# Patient Record
Sex: FEMALE | Race: OTHER | HISPANIC OR LATINO
[De-identification: names, ages, dates, MRNs, and addresses within clinical notes are randomized per-mention and may not be internally consistent; named-entity substitution may affect disease eponyms.]

---

## 2024-07-31 DIAGNOSIS — Z00.00 ENCOUNTER FOR GENERAL ADULT MEDICAL EXAMINATION W/OUT ABNORMAL FINDINGS: ICD-10-CM

## 2024-08-12 ENCOUNTER — RESULT REVIEW (OUTPATIENT)
Age: 29
End: 2024-08-12

## 2024-08-12 ENCOUNTER — APPOINTMENT (OUTPATIENT)
Dept: HEMATOLOGY ONCOLOGY | Facility: CLINIC | Age: 29
End: 2024-08-12
Payer: COMMERCIAL

## 2024-08-12 VITALS
RESPIRATION RATE: 16 BRPM | OXYGEN SATURATION: 99 % | HEIGHT: 55 IN | DIASTOLIC BLOOD PRESSURE: 55 MMHG | TEMPERATURE: 96.7 F | HEART RATE: 71 BPM | SYSTOLIC BLOOD PRESSURE: 97 MMHG | BODY MASS INDEX: 36.4 KG/M2 | WEIGHT: 157.31 LBS

## 2024-08-12 DIAGNOSIS — D50.9 ANEMIA COMPLICATING PREGNANCY, UNSPECIFIED TRIMESTER: ICD-10-CM

## 2024-08-12 DIAGNOSIS — Z78.9 OTHER SPECIFIED HEALTH STATUS: ICD-10-CM

## 2024-08-12 DIAGNOSIS — Z83.3 FAMILY HISTORY OF DIABETES MELLITUS: ICD-10-CM

## 2024-08-12 DIAGNOSIS — O99.019 ANEMIA COMPLICATING PREGNANCY, UNSPECIFIED TRIMESTER: ICD-10-CM

## 2024-08-12 DIAGNOSIS — Z86.2 PERSONAL HISTORY OF DISEASES OF THE BLOOD AND BLOOD-FORMING ORGANS AND CERTAIN DISORDERS INVOLVING THE IMMUNE MECHANISM: ICD-10-CM

## 2024-08-12 PROCEDURE — 99205 OFFICE O/P NEW HI 60 MIN: CPT

## 2024-08-12 RX ORDER — PRENATAL VIT 116/IRON/FA/DHA 28-800-200
CAPSULE ORAL
Refills: 0 | Status: ACTIVE | COMMUNITY

## 2024-08-12 RX ORDER — IRON/IRON ASP GLY/FA/MV-MIN 38 125-25-1MG
TABLET ORAL
Refills: 0 | Status: ACTIVE | COMMUNITY

## 2024-08-12 RX ORDER — ASCORBIC ACID 500 MG
TABLET ORAL
Refills: 0 | Status: ACTIVE | COMMUNITY

## 2024-08-12 NOTE — HISTORY OF PRESENT ILLNESS
[de-identified] : 28 year F  here with anemia.   Denies anemia prior to pregnancy.   Told she was anemic 3 months ago.   Has been compliant with oral iron supplements.   Denies history of thyroid or celiac disease. Denies heavy menses Denies EGD or colonoscopy   Denies fatigue.   Patient has/hasn't taken iron supplemenmts or IV iron in the past.   patient here for consultation for anemia. Referred by [facility].  Nan#[id number]. Due date September 1. 2nd pregnancy. no prior anemia in 1st pregnancy. no prior anemia before pregnancy. no recent colonoscopy or endoscopy. no bleeding.

## 2024-08-12 NOTE — ASSESSMENT
[FreeTextEntry1] :  #Iron deficiency anemia of pregnancy Discussed physiology of iron and erythropoiesis. Discussed iron tablet vs liquid vs intravenous 38 weeks pregnant Patient started oral iron compliant If Ferritin <30 Will do IV iron with venofer x 5 doses and follow up Oct/Nov Follow up 2 weeks after last IV iron appointment   Labs next appointment: CBC, iron, B12, ferritin, retic

## 2024-08-12 NOTE — HISTORY OF PRESENT ILLNESS
[de-identified] : 28 year F  here with anemia.   Denies anemia prior to pregnancy.   Told she was anemic 3 months ago.   Has been compliant with oral iron supplements.   Denies history of thyroid or celiac disease. Denies heavy menses Denies EGD or colonoscopy   Denies fatigue.   Patient has/hasn't taken iron supplemenmts or IV iron in the past.   patient here for consultation for anemia. Referred by [facility].  Nan#[id number]. Due date September 1. 2nd pregnancy. no prior anemia in 1st pregnancy. no prior anemia before pregnancy. no recent colonoscopy or endoscopy. no bleeding.

## 2024-08-12 NOTE — REVIEW OF SYSTEMS
[Night Sweats] : night sweats [Leg Claudication] : intermittent leg claudication [Cough] : cough [Vomiting] : vomiting [Negative] : Heme/Lymph [FreeTextEntry5] : right leg [FreeTextEntry6] : sometimes [FreeTextEntry7] : once a week

## 2024-08-14 ENCOUNTER — NON-APPOINTMENT (OUTPATIENT)
Age: 29
End: 2024-08-14

## 2024-08-26 ENCOUNTER — APPOINTMENT (OUTPATIENT)
Dept: OBGYN | Facility: HOSPITAL | Age: 29
End: 2024-08-26

## 2024-08-26 ENCOUNTER — TRANSCRIPTION ENCOUNTER (OUTPATIENT)
Age: 29
End: 2024-08-26

## 2024-08-29 ENCOUNTER — TRANSCRIPTION ENCOUNTER (OUTPATIENT)
Age: 29
End: 2024-08-29

## 2024-10-28 ENCOUNTER — RESULT REVIEW (OUTPATIENT)
Age: 29
End: 2024-10-28

## 2024-10-28 ENCOUNTER — APPOINTMENT (OUTPATIENT)
Dept: HEMATOLOGY ONCOLOGY | Facility: CLINIC | Age: 29
End: 2024-10-28
Payer: COMMERCIAL

## 2024-10-28 VITALS
BODY MASS INDEX: 33.61 KG/M2 | OXYGEN SATURATION: 98 % | SYSTOLIC BLOOD PRESSURE: 113 MMHG | HEART RATE: 74 BPM | DIASTOLIC BLOOD PRESSURE: 59 MMHG | WEIGHT: 145.25 LBS | HEIGHT: 55 IN | RESPIRATION RATE: 16 BRPM | TEMPERATURE: 97.8 F

## 2024-10-28 DIAGNOSIS — D50.9 ANEMIA COMPLICATING PREGNANCY, UNSPECIFIED TRIMESTER: ICD-10-CM

## 2024-10-28 DIAGNOSIS — O99.019 ANEMIA COMPLICATING PREGNANCY, UNSPECIFIED TRIMESTER: ICD-10-CM

## 2024-10-28 PROCEDURE — 99214 OFFICE O/P EST MOD 30 MIN: CPT

## 2025-01-13 ENCOUNTER — APPOINTMENT (OUTPATIENT)
Dept: HEMATOLOGY ONCOLOGY | Facility: CLINIC | Age: 30
End: 2025-01-13